# Patient Record
Sex: MALE | Race: WHITE | NOT HISPANIC OR LATINO | Employment: OTHER | URBAN - METROPOLITAN AREA
[De-identification: names, ages, dates, MRNs, and addresses within clinical notes are randomized per-mention and may not be internally consistent; named-entity substitution may affect disease eponyms.]

---

## 2018-05-01 ENCOUNTER — CATARACT CONSULT (OUTPATIENT)
Dept: URBAN - METROPOLITAN AREA CLINIC 39 | Facility: CLINIC | Age: 73
End: 2018-05-01

## 2018-05-01 VITALS — DIASTOLIC BLOOD PRESSURE: 76 MMHG | HEIGHT: 60 IN | HEART RATE: 62 BPM | SYSTOLIC BLOOD PRESSURE: 122 MMHG

## 2018-05-01 DIAGNOSIS — H25.812: ICD-10-CM

## 2018-05-01 DIAGNOSIS — H04.123: ICD-10-CM

## 2018-05-01 DIAGNOSIS — H40.013: ICD-10-CM

## 2018-05-01 DIAGNOSIS — H25.811: ICD-10-CM

## 2018-05-01 PROCEDURE — 4040F PNEUMOC VAC/ADMIN/RCVD: CPT

## 2018-05-01 PROCEDURE — G8952 PRE-HTN/HTN, NO F/U, NOT GVN: HCPCS

## 2018-05-01 PROCEDURE — G8427 DOCREV CUR MEDS BY ELIG CLIN: HCPCS

## 2018-05-01 PROCEDURE — 92025-2 CORNEAL TOPOGRAPHY, PT

## 2018-05-01 PROCEDURE — G8482 FLU IMMUNIZE ORDER/ADMIN: HCPCS

## 2018-05-01 PROCEDURE — 1036F TOBACCO NON-USER: CPT

## 2018-05-01 PROCEDURE — 99204 OFFICE O/P NEW MOD 45 MIN: CPT

## 2018-05-01 PROCEDURE — 92015 DETERMINE REFRACTIVE STATE: CPT

## 2018-05-01 RX ORDER — DUREZOL 0.5 MG/ML: 1 EMULSION OPHTHALMIC TWICE A DAY

## 2018-05-01 RX ORDER — MOXIFLOXACIN HYDROCHLORIDE 5 MG/ML: 1 SOLUTION/ DROPS OPHTHALMIC

## 2018-05-01 RX ORDER — NEPAFENAC 3 MG/ML: 1 SUSPENSION/ DROPS OPHTHALMIC ONCE A DAY

## 2018-05-01 ASSESSMENT — VISUAL ACUITY
OD_SC: 20/80
OD_GLARE: 20/100
OD_CC: J2
OS_CC: 20/40+2
OS_CC: J3
OS_GLARE: 20/200
OD_CC: 20/20-1
OS_SC: 20/200+1
OD_SC: J2
OS_SC: J3

## 2018-05-01 ASSESSMENT — TONOMETRY
OS_IOP_MMHG: 9
OD_IOP_MMHG: 9

## 2018-05-31 ENCOUNTER — SURGERY/PROCEDURE (OUTPATIENT)
Dept: URBAN - METROPOLITAN AREA CLINIC 39 | Facility: CLINIC | Age: 73
End: 2018-05-31

## 2018-05-31 ENCOUNTER — TECH ONLY (OUTPATIENT)
Dept: URBAN - METROPOLITAN AREA CLINIC 39 | Facility: CLINIC | Age: 73
End: 2018-05-31

## 2018-05-31 DIAGNOSIS — H25.811: ICD-10-CM

## 2018-05-31 DIAGNOSIS — Z96.1: ICD-10-CM

## 2018-05-31 PROCEDURE — G8428 CUR MEDS NOT DOCUMENT: HCPCS

## 2018-05-31 PROCEDURE — 99211T TECH SERVICE

## 2018-05-31 PROCEDURE — 6698454CV REMOVE CATARACT, INSERT LENS,SX ONLY, CUSTOM VISION

## 2018-05-31 PROCEDURE — G8482 FLU IMMUNIZE ORDER/ADMIN: HCPCS

## 2018-05-31 PROCEDURE — 4040F PNEUMOC VAC/ADMIN/RCVD: CPT

## 2018-05-31 PROCEDURE — 1036F TOBACCO NON-USER: CPT

## 2018-05-31 PROCEDURE — 65772LRI LRI DURING CAT SX

## 2018-05-31 PROCEDURE — 66999LNSR LENSAR LASER FOR CAT SX

## 2018-05-31 ASSESSMENT — VISUAL ACUITY: OD_SC: 20/40+2

## 2018-05-31 ASSESSMENT — TONOMETRY: OD_IOP_MMHG: 15

## 2018-06-07 ENCOUNTER — POST OP/EVAL OF SECOND EYE (OUTPATIENT)
Dept: URBAN - METROPOLITAN AREA CLINIC 39 | Facility: CLINIC | Age: 73
End: 2018-06-07

## 2018-06-07 ENCOUNTER — SURGERY/PROCEDURE (OUTPATIENT)
Dept: URBAN - METROPOLITAN AREA CLINIC 39 | Facility: CLINIC | Age: 73
End: 2018-06-07

## 2018-06-07 ENCOUNTER — TECH ONLY (OUTPATIENT)
Dept: URBAN - METROPOLITAN AREA CLINIC 39 | Facility: CLINIC | Age: 73
End: 2018-06-07

## 2018-06-07 DIAGNOSIS — H25.812: ICD-10-CM

## 2018-06-07 DIAGNOSIS — Z96.1: ICD-10-CM

## 2018-06-07 PROCEDURE — 66999LNSR LENSAR LASER FOR CAT SX

## 2018-06-07 PROCEDURE — G8785 BP SCRN NO PERF AT INTERVAL: HCPCS

## 2018-06-07 PROCEDURE — G8482 FLU IMMUNIZE ORDER/ADMIN: HCPCS

## 2018-06-07 PROCEDURE — 65772LRI LRI DURING CAT SX

## 2018-06-07 PROCEDURE — G8427 DOCREV CUR MEDS BY ELIG CLIN: HCPCS

## 2018-06-07 PROCEDURE — 4040F PNEUMOC VAC/ADMIN/RCVD: CPT

## 2018-06-07 PROCEDURE — 99213 OFFICE O/P EST LOW 20 MIN: CPT

## 2018-06-07 PROCEDURE — G8428 CUR MEDS NOT DOCUMENT: HCPCS

## 2018-06-07 PROCEDURE — 1036F TOBACCO NON-USER: CPT

## 2018-06-07 PROCEDURE — 99024 POSTOP FOLLOW-UP VISIT: CPT

## 2018-06-07 PROCEDURE — 6698454CV REMOVE CATARACT, INSERT LENS,SX ONLY, CUSTOM VISION

## 2018-06-07 ASSESSMENT — TONOMETRY
OD_IOP_MMHG: 16
OS_IOP_MMHG: 15
OS_IOP_MMHG: 10

## 2018-06-07 ASSESSMENT — VISUAL ACUITY
OS_GLARE: 20/200
OS_SC: 20/40-1
OD_SC: 20/20-2
OS_SC: 20/80-1

## 2018-08-10 NOTE — PATIENT DISCUSSION
(H25.042) Posterior subcapsular polar age-related cataract, left eye - Assesment : Examination revealed Posterior Subcapsular Polar Senile Cataract. - Plan : Monitor for changes. Advised patient to call our office with decreased vision or increased symptoms.

## 2018-08-10 NOTE — PATIENT DISCUSSION
(H25.013) Cortical age-related cataract, bilateral - Assesment : Examination revealed Cortical Senile Cataract. Mild OU. Patient is currently asymptomatic and functioning well. - Plan : Monitor for changes. Advised patient to call our office with decreased vision or increased symptoms.

## 2018-08-10 NOTE — PATIENT DISCUSSION
(H11.043) Peripheral pterygium, stationary, bilateral - Assesment : Examination revealed Pterygium, no changes. Temporal Quiescent OU - Plan : Monitor for changes. Advised patient to call our office with decreased vision or increased symptoms.

## 2020-08-19 ENCOUNTER — SURGERY/PROCEDURE (OUTPATIENT)
Dept: URBAN - METROPOLITAN AREA SURGERY 14 | Facility: SURGERY | Age: 75
End: 2020-08-19

## 2020-08-19 ENCOUNTER — ESTABLISHED PATIENT (OUTPATIENT)
Dept: URBAN - METROPOLITAN AREA CLINIC 39 | Facility: CLINIC | Age: 75
End: 2020-08-19

## 2020-08-19 DIAGNOSIS — H26.493: ICD-10-CM

## 2020-08-19 PROCEDURE — 92014 COMPRE OPH EXAM EST PT 1/>: CPT

## 2020-08-19 ASSESSMENT — VISUAL ACUITY
OD_BAT: 20/80
OS_BAT: 20/70
OS_SC: 20/20
OD_SC: 20/20-1

## 2020-08-19 ASSESSMENT — TONOMETRY
OD_IOP_MMHG: 16
OS_IOP_MMHG: 15

## 2024-02-09 ENCOUNTER — COMPREHENSIVE EXAM (OUTPATIENT)
Dept: URBAN - METROPOLITAN AREA CLINIC 38 | Facility: CLINIC | Age: 79
End: 2024-02-09

## 2024-02-09 DIAGNOSIS — H35.3131: ICD-10-CM

## 2024-02-09 DIAGNOSIS — H43.812: ICD-10-CM

## 2024-02-09 DIAGNOSIS — Z98.890: ICD-10-CM

## 2024-02-09 DIAGNOSIS — H40.013: ICD-10-CM

## 2024-02-09 DIAGNOSIS — Z96.1: ICD-10-CM

## 2024-02-09 PROCEDURE — 92014 COMPRE OPH EXAM EST PT 1/>: CPT

## 2024-02-09 PROCEDURE — 92015 DETERMINE REFRACTIVE STATE: CPT

## 2024-02-09 PROCEDURE — 92134 CPTRZ OPH DX IMG PST SGM RTA: CPT

## 2024-02-09 ASSESSMENT — VISUAL ACUITY
OD_CC: J1
OU_SC: J4
OU_SC: 20/20
OD_OTHER: OTC +2.50
OS_CC: J1
OD_SC: J8
OS_SC: 20/20
OD_SC: 20/20
OS_SC: J8
OU_CC: J1

## 2024-02-09 ASSESSMENT — KERATOMETRY
OD_K1POWER_DIOPTERS: 41.50
OD_K2POWER_DIOPTERS: 42.00
OD_AXISANGLE_DEGREES: 175
OS_AXISANGLE_DEGREES: 150
OS_K1POWER_DIOPTERS: 42.25
OS_AXISANGLE2_DEGREES: 60
OD_AXISANGLE2_DEGREES: 85
OS_K2POWER_DIOPTERS: 41.75

## 2024-02-09 ASSESSMENT — TONOMETRY
OS_IOP_MMHG: 15
OD_IOP_MMHG: 15

## 2024-05-15 ENCOUNTER — TECH ONLY (OUTPATIENT)
Dept: URBAN - METROPOLITAN AREA CLINIC 38 | Facility: CLINIC | Age: 79
End: 2024-05-15

## 2024-05-15 DIAGNOSIS — Z83.511: ICD-10-CM

## 2024-05-15 DIAGNOSIS — H40.013: ICD-10-CM

## 2024-05-15 PROCEDURE — 99211T TECH SERVICE

## 2024-05-15 PROCEDURE — 92083 EXTENDED VISUAL FIELD XM: CPT

## 2024-05-15 PROCEDURE — 92250 FUNDUS PHOTOGRAPHY W/I&R: CPT

## 2024-05-15 ASSESSMENT — KERATOMETRY
OS_K1POWER_DIOPTERS: 42.25
OD_K1POWER_DIOPTERS: 41.50
OD_K2POWER_DIOPTERS: 42.00
OD_AXISANGLE2_DEGREES: 85
OD_AXISANGLE_DEGREES: 175
OS_AXISANGLE2_DEGREES: 60
OS_K2POWER_DIOPTERS: 41.75
OS_AXISANGLE_DEGREES: 150

## 2025-01-16 ENCOUNTER — EMERGENCY VISIT (OUTPATIENT)
Age: 80
End: 2025-01-16

## 2025-01-16 DIAGNOSIS — G51.4: ICD-10-CM

## 2025-01-16 DIAGNOSIS — Z83.511: ICD-10-CM

## 2025-01-16 DIAGNOSIS — H35.3131: ICD-10-CM

## 2025-01-16 DIAGNOSIS — H40.013: ICD-10-CM

## 2025-01-16 PROCEDURE — 92014 COMPRE OPH EXAM EST PT 1/>: CPT

## 2025-01-16 PROCEDURE — 92015 DETERMINE REFRACTIVE STATE: CPT

## 2025-01-16 PROCEDURE — 92134 CPTRZ OPH DX IMG PST SGM RTA: CPT

## 2025-07-30 ENCOUNTER — TECH ONLY (OUTPATIENT)
Age: 80
End: 2025-07-30

## 2025-07-30 DIAGNOSIS — H40.013: ICD-10-CM

## 2025-07-30 PROCEDURE — 92083 EXTENDED VISUAL FIELD XM: CPT

## 2025-07-30 PROCEDURE — 99211T TECH SERVICE

## 2025-07-30 PROCEDURE — 92250 FUNDUS PHOTOGRAPHY W/I&R: CPT
